# Patient Record
Sex: MALE
[De-identification: names, ages, dates, MRNs, and addresses within clinical notes are randomized per-mention and may not be internally consistent; named-entity substitution may affect disease eponyms.]

---

## 2023-05-31 PROBLEM — Z00.00 ENCOUNTER FOR PREVENTIVE HEALTH EXAMINATION: Status: ACTIVE | Noted: 2023-05-31

## 2023-06-02 ENCOUNTER — APPOINTMENT (OUTPATIENT)
Dept: PLASTIC SURGERY | Facility: CLINIC | Age: 23
End: 2023-06-02

## 2023-08-16 ENCOUNTER — APPOINTMENT (OUTPATIENT)
Dept: PLASTIC SURGERY | Facility: CLINIC | Age: 23
End: 2023-08-16
Payer: COMMERCIAL

## 2023-08-16 VITALS
BODY MASS INDEX: 16.86 KG/M2 | HEART RATE: 71 BPM | DIASTOLIC BLOOD PRESSURE: 69 MMHG | WEIGHT: 100 LBS | HEIGHT: 64.5 IN | TEMPERATURE: 97.7 F | SYSTOLIC BLOOD PRESSURE: 102 MMHG

## 2023-08-16 DIAGNOSIS — F64.0 TRANSSEXUALISM: ICD-10-CM

## 2023-08-16 DIAGNOSIS — Z72.3 LACK OF PHYSICAL EXERCISE: ICD-10-CM

## 2023-08-16 PROCEDURE — 99204 OFFICE O/P NEW MOD 45 MIN: CPT

## 2023-08-20 PROBLEM — F64.0 GENDER DYSPHORIA IN ADOLESCENT AND ADULT: Status: ACTIVE | Noted: 2023-08-20

## 2023-08-20 RX ORDER — TESTOSTERONE CYPIONATE 200 MG/ML
VIAL (ML) INTRAMUSCULAR
Refills: 0 | Status: ACTIVE | COMMUNITY

## 2023-08-20 NOTE — PHYSICAL EXAM
[de-identified] : NAD.  BMI 16.9 [de-identified] : Normal respiratory effort [de-identified] : Breast exam was performed with a medical assistant chaperone present ([email or web address]). No dominant masses, skin changes, nipple retraction, or palpable axillary lymphadenopathy. SN->N distance is 16.5 cm on the left and 17.5 cm on the right; width is 9 cm on the left and 9 cm on the right; N->IMF is 4.5 cm on the left and 5 cm on the right. There is no ptosis.

## 2023-08-20 NOTE — HISTORY OF PRESENT ILLNESS
[FreeTextEntry1] : 23-year-old person designated female at birth (Jose; they/them) presents for consultation for top surgery.  They state they want a "masculine" chest.  They have been on testosterone for 3 years.  They do not bind.  They deny any recent lumps, bumps, or other recent changes in their breasts.  They have not had any breast imaging.  They endorse a family history of breast cancer in a maternal grandmother in her 60s.  They are uncertain of any family history of venous blood clots.  They state that nipple sensation is somewhat important to them (3 out of 5 importance).  They have no plan to ever breast or chest feed and expressed understanding that this procedure would render them unable to do so.  They work as a  at an ice cream shop.  They live with their dad and brother.  They state that their dad would be able to assist them after surgery.  They deny nicotine use, alcohol use, and other recreational drug use.

## 2023-08-20 NOTE — ASSESSMENT
[FreeTextEntry1] : The patient is a potential candidate for a limited incision, circumareolar, or infra-areolar double incision technique.  They are considering their options.  To proceed, we will need a mental health letter of assessment, clarification about their family history of clotting, and clarification about which technique they want to undergo.  They are welcome to return for follow-up consultation in person or as telehealth.

## 2023-08-30 ENCOUNTER — TRANSCRIPTION ENCOUNTER (OUTPATIENT)
Age: 23
End: 2023-08-30

## 2023-10-24 ENCOUNTER — TRANSCRIPTION ENCOUNTER (OUTPATIENT)
Age: 23
End: 2023-10-24